# Patient Record
Sex: MALE | Race: WHITE | URBAN - METROPOLITAN AREA
[De-identification: names, ages, dates, MRNs, and addresses within clinical notes are randomized per-mention and may not be internally consistent; named-entity substitution may affect disease eponyms.]

---

## 2017-09-29 ENCOUNTER — HOSPITAL ENCOUNTER (EMERGENCY)
Facility: CLINIC | Age: 37
Discharge: HOME | End: 2017-09-29
Attending: EMERGENCY MEDICINE | Admitting: EMERGENCY MEDICINE

## 2017-09-29 VITALS
OXYGEN SATURATION: 100 % | HEART RATE: 73 BPM | TEMPERATURE: 98 F | DIASTOLIC BLOOD PRESSURE: 88 MMHG | HEIGHT: 69 IN | SYSTOLIC BLOOD PRESSURE: 122 MMHG | BODY MASS INDEX: 28.14 KG/M2 | WEIGHT: 190 LBS | RESPIRATION RATE: 16 BRPM

## 2017-09-29 DIAGNOSIS — T15.02XA CORNEAL FOREIGN BODY, LEFT, INITIAL ENCOUNTER: ICD-10-CM

## 2017-09-29 PROCEDURE — 99283 EMERGENCY DEPT VISIT LOW MDM: CPT | Mod: 25

## 2017-09-29 PROCEDURE — 99284 EMERGENCY DEPT VISIT MOD MDM: CPT | Mod: 25 | Performed by: EMERGENCY MEDICINE

## 2017-09-29 PROCEDURE — 25000125 ZZHC RX 250: Performed by: EMERGENCY MEDICINE

## 2017-09-29 PROCEDURE — 25000132 ZZH RX MED GY IP 250 OP 250 PS 637: Performed by: EMERGENCY MEDICINE

## 2017-09-29 PROCEDURE — 65222 REMOVE FOREIGN BODY FROM EYE: CPT | Performed by: EMERGENCY MEDICINE

## 2017-09-29 PROCEDURE — 65222 REMOVE FOREIGN BODY FROM EYE: CPT

## 2017-09-29 RX ORDER — GATIFLOXACIN 5 MG/ML
1 SOLUTION/ DROPS OPHTHALMIC 4 TIMES DAILY
Status: DISCONTINUED | OUTPATIENT
Start: 2017-09-29 | End: 2017-09-29 | Stop reason: HOSPADM

## 2017-09-29 RX ORDER — TETRACAINE HYDROCHLORIDE 5 MG/ML
1-2 SOLUTION OPHTHALMIC ONCE
Status: COMPLETED | OUTPATIENT
Start: 2017-09-29 | End: 2017-09-29

## 2017-09-29 RX ORDER — IBUPROFEN 400 MG/1
800 TABLET, FILM COATED ORAL ONCE
Status: COMPLETED | OUTPATIENT
Start: 2017-09-29 | End: 2017-09-29

## 2017-09-29 RX ADMIN — IBUPROFEN 800 MG: 400 TABLET ORAL at 05:17

## 2017-09-29 RX ADMIN — TETRACAINE HYDROCHLORIDE 2 DROP: 5 SOLUTION OPHTHALMIC at 02:59

## 2017-09-29 RX ADMIN — GATIFLOXACIN 1 DROP: 5 SOLUTION/ DROPS OPHTHALMIC at 05:17

## 2017-09-29 ASSESSMENT — VISUAL ACUITY
OS: 20/80
OD: 20/50

## 2017-09-29 NOTE — ED NOTES
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

## 2017-09-29 NOTE — DISCHARGE INSTRUCTIONS
"  Cuerpo extraño en la córnea    La córnea es la capa transparente que forma la parte anterior del globo ocular; está encargada de enfocar la oumar y proteger al radhika contra el polvo y los microbios. Es posible que un cuerpo extraño quede alojado en la córnea. Greta tenorio de suciedad o arenilla no suele representar mayores problemas, sharif cualquier cosa de metal u objeto que perfore la córnea puede causar graves daños.  Cuándo debe acudir a la israel de emergencias  Cuanto más espere, mayor será el peligro de que se lesione o contraiga greta infección. Consiga tratamiento médico de emergencia en greta israel de emergencias (\"ER\", por chetan siglas en inglés) de inmediato en tiffanie de tener:    Greta partícula en el radhika, que no logra expulsar con un chorro de agua.    Un radhika que queda muy hinchado o adolorido después de que se ha extraído el objeto.    Un objeto incrustado en el radhika (cúbrase los dos ojos con greta compresa estéril y llame al 911).    Si la córnea (frente del radhika) está shikha o nublada.    Hay lin en el radhika.    Tiene problemas de visión.  Qué puede esperar en la ER    Un médico le hará preguntas sobre la lesión y le examinará el radhika.    Es posible que le pongan un colirio anestésico para aliviar la molestia.    Le examinarán el globo ocular mediante un microscopio equipado de greta oumar intensa. Es posible que le pongan un colorante especial (tintura de fluoresceína) en la córnea, para poder malik más claramente el objeto incrustado.    Si el cuerpo extraño está suelto, podrían sacárselo.    Es probable que las lesiones graves andrés tratadas por un médico especializado en los ojos (oftalmólogo).  Atención posterior  Llame a mckeon médico si experimenta cualquiera de estos síntomas después de regresar a mckeon casa:    Fiebre de más de 101  F (38.3  C)    Mayor enrojecimiento o dolor en el radhika    Secreción procedente del radhika    Visión borrosa o disminuida  Date Last Reviewed: 6/9/2015 2000-2017 [employer] [address] [facility], [place] [address]. Todos los derechos reservados. Esta información no pretende sustituir la atención médica profesional. Sólo mckeon médico puede diagnosticar y tratar un problema de arlene.

## 2017-09-29 NOTE — ED AVS SNAPSHOT
[facility] Emergency Department    [address] [address]    Phone:  [phone]    Fax:  [phone]                                       Vance Krause   MRN: [id number]    Department:  [facility] Emergency Department   Date of Visit:  9/29/2017           After Visit Summary Signature Page     I have received my discharge instructions, and my questions have been answered. I have discussed any challenges I see with this plan with the nurse or doctor.    ..........................................................................................................................................  Patient/Patient Representative Signature      ..........................................................................................................................................  Patient Representative Print Name and Relationship to Patient    ..................................................               ................................................  Date                                            Time    ..........................................................................................................................................  Reviewed by Signature/Title    ...................................................              ..............................................  Date                                                            Time

## 2017-09-29 NOTE — ED PROVIDER NOTES
"  History     Chief Complaint   Patient presents with     Eye Problem     HPI  Vance Krause is a 37 year old male with no significant past medical history is a noncontact wearer presents for evaluation about 2 days of right eye pain with watery discharge.  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  Patient does not recall any specific event where an object struck his eye but did note significant dust falling into his eyes when he removed his protective eyewear.  Patient with redness and watering discharge and pain in his eye ever since.  The patient reports a present foreign body sensation    I have reviewed the Medications, Allergies, Past Medical and Surgical History, and Social History in the Epic system.    Allergies: No Known Allergies      No current facility-administered medications on file prior to encounter.   No current outpatient prescriptions on file prior to encounter.    There is no problem list on file for this patient.      No past surgical history on file.    Social History   Substance Use Topics     Smoking status: Never Smoker     Smokeless tobacco: Never Used     Alcohol use Not on file      Comment: occassional         There is no immunization history on file for this patient.    BMI: Estimated body mass index is 28.06 kg/(m^2) as calculated from the following:    Height as of this encounter: 1.753 m (5' 9\").    Weight as of this encounter: 86.2 kg (190 lb).      Review of Systems   Constitutional: Negative for appetite change and fever.   HENT: Negative for congestion.    Eyes: Positive for photophobia, pain, redness and visual disturbance (Slight blurring).        Right   Respiratory: Negative for cough and shortness of breath.    Cardiovascular: Negative for chest pain.   Gastrointestinal: Negative for abdominal pain and nausea.   Neurological: Negative for dizziness and headaches.   All other systems reviewed and are " "negative.      Physical Exam   BP: (!) 136/104  Pulse: 73  Temp: 98  F (36.7  C)  Resp: 16  Height: 175.3 cm (5' 9\")  Weight: 86.2 kg (190 lb)  SpO2: 98 %  Physical Exam   Constitutional: He is oriented to person, place, and time. He appears well-developed and well-nourished. No distress.   HENT:   Head: Normocephalic and atraumatic.   Mouth/Throat: Oropharynx is clear and moist.   Eyes: Pupils are equal, round, and reactive to light. Right eye exhibits no discharge. Foreign body present in the right eye. Right conjunctiva is injected.       Cardiovascular: Normal rate and regular rhythm.    Pulmonary/Chest: Effort normal.   Neurological: He is alert and oriented to person, place, and time.   Skin: Skin is warm and dry.   Psychiatric: He has a normal mood and affect.   Nursing note and vitals reviewed.      ED Course     ED Course     FB removal  Date/Time: 9/29/2017 4:57 AM  Performed by: MEL ROUSSEAU  Authorized by: MEL ROUSSEAU   Consent: Verbal consent obtained.  Risks and benefits: risks, benefits and alternatives were discussed  Consent given by: patient  Patient understanding: patient states understanding of the procedure being performed  Patient consent: the patient's understanding of the procedure matches consent given  Body area: eye  Location details: right cornea    Anesthesia:  Local Anesthetic: tetracaine drops    Sedation:  Patient sedated: no  Patient restrained: no  Patient cooperative: yes  Localization method: slit lamp  Removal mechanism: irrigation (18-gauge needle)  Eye examined with fluorescein.  Fluorescein uptake.  Corneal abrasion size: small  Corneal abrasion location: central  No residual rust ring present.  Dressing: antibiotic drops  Depth: superficial  Complexity: simple  1 objects recovered.  Post-procedure assessment: foreign body removed  Patient tolerance: Patient tolerated the procedure well with no immediate complications                   Labs Ordered and " Resulted from Time of ED Arrival Up to the Time of Departure from the ED - No data to display    Assessments & Plan (with Medical Decision Making)  37-year-old male presenting for evaluation of right eye redness and pain with foreign body sensation after cutting concrete 2 days ago.  Physical exam identified the presence of a centrally located embedded foreign on the cornea.  Foreign body was successfully removed as above.  Patient tolerated well and had significant relief in sensation of a foreign body and pain.  Recommended some genetic treatment with ibuprofen for pain and gatifloxacin eyedrops for secondary infection.  Patient advised to follow-up with [facility] if his eye symptoms have not fully resolved within approximately 24-48 hours      I have reviewed the nursing notes.    I have reviewed the findings, diagnosis, plan and need for follow up with the patient.       There are no discharge medications for this patient.      Final diagnoses:   Corneal foreign body, left, initial encounter       9/29/2017   [facility] EMERGENCY DEPARTMENT     Layne, Rush Alaniz MD  09/30/17 0047

## 2017-09-29 NOTE — ED AVS SNAPSHOT
" [facility] Emergency Department    [address] [address]    Phone:  [phone]    Fax:  [phone]                                       Vance Krause   MRN: [id number]    Department:  [facility] Emergency Department   Date of Visit:  9/29/2017           Patient Information     Date Of Birth          1980        Your diagnoses for this visit were:     Corneal foreign body, left, initial encounter        You were seen by Rush Layne MD.      Follow-up Information     Go to [facility].    Why:  As needed if your pain worsens or you develop any new symptoms    Contact information:    [address] [address] [phone]        Discharge Instructions         Cuerpo extraño en la córnea    La córnea es la capa transparente que forma la parte anterior del globo ocular; está encargada de enfocar la oumar y proteger al radhika contra el polvo y los microbios. Es posible que un cuerpo extraño quede alojado en la córnea. Greta tenorio de suciedad o arenilla no suele representar mayores problemas, sharif cualquier cosa de metal u objeto que perfore la córnea puede causar graves daños.  Cuándo debe acudir a la israel de emergencias  Cuanto más espere, mayor será el peligro de que se lesione o contraiga greta infección. Consiga tratamiento médico de emergencia en greta israel de emergencias (\"ER\", por chetan siglas en inglés) de inmediato en tiffanie de tener:    Greta partícula en el radhika, que no logra expulsar con un chorro de agua.    Un radhika que queda muy hinchado o adolorido después de que se ha extraído el objeto.    Un objeto incrustado en el radhika (cúbrase los dos ojos con greta compresa estéril y llame al 911).    Si la córnea (frente del radhika) está shikha o nublada.    Hay lin en el radhika.    Tiene problemas de visión.  Qué puede esperar en la ER    Un médico le hará preguntas sobre la lesión y le examinará el radhika.    Es posible que le pongan un colirio anestésico para " aliviar la molestia.    Le examinarán el globo ocular mediante un microscopio equipado de greta oumar intensa. Es posible que le pongan un colorante especial (tintura de fluoresceína) en la córnea, para poder malik más claramente el objeto incrustado.    Si el cuerpo extraño está suelto, podrían sacárselo.    Es probable que las lesiones graves andrés tratadas por un médico especializado en los ojos (oftalmólogo).  Atención posterior  Llame a mckeon médico si experimenta cualquiera de estos síntomas después de regresar a mckeon casa:    Fiebre de más de 101  F (38.3  C)    Mayor enrojecimiento o dolor en el radhika    Secreción procedente del radhika    Visión borrosa o disminuida  Date Last Reviewed: 6/9/2015 2000-2017 [employer]. [address] [place] [address]. Todos los derechos reservados. Esta información no pretende sustituir la atención médica profesional. Sólo mckeon médico puede diagnosticar y tratar un problema de arlene.          24 Hour Appointment Hotline       To make an appointment at any [facility], call [phone] ([phone]). If you don't have a family doctor or clinic, we will help you find one. [facility] clinics are conveniently located to serve the needs of you and your family.             Review of your medicines      Notice     You have not been prescribed any medications.            Orders Needing Specimen Collection     None      Pending Results     No orders found from 9/27/2017 to 9/30/2017.            Pending Culture Results     No orders found from 9/27/2017 to 9/30/2017.            Pending Results Instructions     If you had any lab results that were not finalized at the time of your Discharge, you can call the ED Lab Result RN at [phone]. You will be contacted by this team for any positive Lab results or changes in treatment. The nurses are available 7 days a week from 10A to 6:30P.  You can leave a message 24 hours per day and they will return your call.        Test  "Results From Your Hospital Stay               Thank you for choosing [facility]       Thank you for choosing [facility] for your care. Our goal is always to provide you with excellent care. Hearing back from our patients is one way we can continue to improve our services. Please take a few minutes to complete the written survey that you may receive in the mail after you visit with us. Thank you!        [employer] Information     [facility] lets you send messages to your doctor, view your test results, renew your prescriptions, schedule appointments and more. To sign up, go to [email or web address] . Click on \"Log in\" on the left side of the screen, which will take you to the Welcome page. Then click on \"Sign up Now\" on the right side of the page.     You will be asked to enter the access code listed below, as well as some personal information. Please follow the directions to create your username and password.     Your access code is: [email or web address]  Expires: 2017  5:01 AM     Your access code will  in 90 days. If you need help or a new code, please call your [facility] clinic or [phone].        Care EveryWhere ID     This is your Care EveryWhere ID. This could be used by other organizations to access your [facility] medical records  [id number]        Equal Access to Services     MYCHAL SALAZAR : Saurav Hidalgo, waaxda luqadaha, qaybta kaalmada adeegyada, buzz azul. So [facility] [phone].    ATENCIÓN: Si habla español, tiene a mckeon disposición servicios gratuitos de asistencia lingüística. Llame al [phone].    We comply with applicable federal civil rights laws and [place] laws. We do not discriminate on the basis of race, color, national origin, age, disability sex, sexual orientation or gender identity.            After Visit Summary       This is your record. Keep this with you and show to your community pharmacist(s) and doctor(s) at your next visit.  "

## 2017-09-30 ASSESSMENT — ENCOUNTER SYMPTOMS
NAUSEA: 0
ABDOMINAL PAIN: 0
EYE REDNESS: 1
FEVER: 0
EYE PAIN: 1
SHORTNESS OF BREATH: 0
HEADACHES: 0
COUGH: 0
APPETITE CHANGE: 0
PHOTOPHOBIA: 1
DIZZINESS: 0